# Patient Record
Sex: FEMALE | Race: WHITE | URBAN - METROPOLITAN AREA
[De-identification: names, ages, dates, MRNs, and addresses within clinical notes are randomized per-mention and may not be internally consistent; named-entity substitution may affect disease eponyms.]

---

## 2017-02-18 ENCOUNTER — HOSPITAL ENCOUNTER (EMERGENCY)
Facility: CLINIC | Age: 37
Discharge: HOME | End: 2017-02-18
Attending: FAMILY MEDICINE | Admitting: FAMILY MEDICINE

## 2017-02-18 ENCOUNTER — APPOINTMENT (OUTPATIENT)
Dept: GENERAL RADIOLOGY | Facility: CLINIC | Age: 37
End: 2017-02-18
Attending: FAMILY MEDICINE

## 2017-02-18 VITALS
HEART RATE: 90 BPM | RESPIRATION RATE: 20 BRPM | OXYGEN SATURATION: 95 % | DIASTOLIC BLOOD PRESSURE: 67 MMHG | SYSTOLIC BLOOD PRESSURE: 86 MMHG | TEMPERATURE: 98.6 F

## 2017-02-18 DIAGNOSIS — J45.901 ASTHMA EXACERBATION: ICD-10-CM

## 2017-02-18 LAB
FLUAV+FLUBV AG SPEC QL: NEGATIVE
FLUAV+FLUBV AG SPEC QL: NORMAL
SPECIMEN SOURCE: NORMAL

## 2017-02-18 PROCEDURE — 94640 AIRWAY INHALATION TREATMENT: CPT | Mod: 76

## 2017-02-18 PROCEDURE — 71020 XR CHEST 2 VW: CPT | Mod: TC

## 2017-02-18 PROCEDURE — 40000917 ZZH STATISTIC PEAK FLOW MEASUREMENT

## 2017-02-18 PROCEDURE — 99284 EMERGENCY DEPT VISIT MOD MDM: CPT | Performed by: FAMILY MEDICINE

## 2017-02-18 PROCEDURE — 25000308 HC RX OP HPI UCR WEL MED 250 IP 250: Performed by: FAMILY MEDICINE

## 2017-02-18 PROCEDURE — 25000125 ZZHC RX 250

## 2017-02-18 PROCEDURE — 94640 AIRWAY INHALATION TREATMENT: CPT

## 2017-02-18 PROCEDURE — 87804 INFLUENZA ASSAY W/OPTIC: CPT | Performed by: FAMILY MEDICINE

## 2017-02-18 PROCEDURE — 25000125 ZZHC RX 250: Performed by: FAMILY MEDICINE

## 2017-02-18 PROCEDURE — 99285 EMERGENCY DEPT VISIT HI MDM: CPT | Mod: 25

## 2017-02-18 RX ORDER — IPRATROPIUM BROMIDE AND ALBUTEROL SULFATE 2.5; .5 MG/3ML; MG/3ML
3 SOLUTION RESPIRATORY (INHALATION) ONCE
Status: COMPLETED | OUTPATIENT
Start: 2017-02-18 | End: 2017-02-18

## 2017-02-18 RX ORDER — IPRATROPIUM BROMIDE AND ALBUTEROL SULFATE 2.5; .5 MG/3ML; MG/3ML
SOLUTION RESPIRATORY (INHALATION)
Status: COMPLETED
Start: 2017-02-18 | End: 2017-02-18

## 2017-02-18 RX ORDER — PREDNISONE 20 MG/1
60 TABLET ORAL ONCE
Status: COMPLETED | OUTPATIENT
Start: 2017-02-18 | End: 2017-02-18

## 2017-02-18 RX ORDER — PREDNISONE 20 MG/1
60 TABLET ORAL DAILY
Qty: 15 TABLET | Refills: 0 | Status: SHIPPED | OUTPATIENT
Start: 2017-02-18 | End: 2017-08-09

## 2017-02-18 RX ORDER — IPRATROPIUM BROMIDE AND ALBUTEROL SULFATE 2.5; .5 MG/3ML; MG/3ML
1 SOLUTION RESPIRATORY (INHALATION) EVERY 6 HOURS PRN
Qty: 30 VIAL | Refills: 1 | Status: SHIPPED | OUTPATIENT
Start: 2017-02-18

## 2017-02-18 RX ORDER — ALBUTEROL SULFATE 0.83 MG/ML
2.5 SOLUTION RESPIRATORY (INHALATION) ONCE
Status: COMPLETED | OUTPATIENT
Start: 2017-02-18 | End: 2017-02-18

## 2017-02-18 RX ADMIN — PREDNISONE 60 MG: 20 TABLET ORAL at 08:52

## 2017-02-18 RX ADMIN — IPRATROPIUM BROMIDE AND ALBUTEROL SULFATE 3 ML: .5; 3 SOLUTION RESPIRATORY (INHALATION) at 08:16

## 2017-02-18 RX ADMIN — IPRATROPIUM BROMIDE AND ALBUTEROL SULFATE 3 ML: 2.5; .5 SOLUTION RESPIRATORY (INHALATION) at 08:28

## 2017-02-18 RX ADMIN — IPRATROPIUM BROMIDE AND ALBUTEROL SULFATE 3 ML: .5; 3 SOLUTION RESPIRATORY (INHALATION) at 08:54

## 2017-02-18 RX ADMIN — ALBUTEROL SULFATE 2.5 MG: 2.5 SOLUTION RESPIRATORY (INHALATION) at 10:21

## 2017-02-18 RX ADMIN — IPRATROPIUM BROMIDE AND ALBUTEROL SULFATE 3 ML: .5; 3 SOLUTION RESPIRATORY (INHALATION) at 08:28

## 2017-02-18 NOTE — ED AVS SNAPSHOT
[facility] Emergency Department    [address] [place] [address]    Phone:  [phone]    Fax:  [phone]                                       Maci Carrasco   MRN: [id number]    Department:  [facility] Emergency Department   Date of Visit:  2/18/2017           Patient Information     Date Of Birth          1980        Your diagnoses for this visit were:     Asthma exacerbation        You were seen by James Summers MD.      Follow-up Information     Follow up with Gypsy Cruz MD. Schedule an appointment as soon as possible for a visit in 4 days.    Specialty:  Family Practice    Why:  For follow up on your ED stay, If not improving.    Contact information:    [place] [address] [place] [address] [phone]        Discharge References/Attachments     ASTHMA, DISCHARGE INSTRUCTIONS FOR (ENGLISH)    USING A NEBULIZER WITH A MOUTHPIECE, STEP-BY-STEP (ENGLISH)      24 Hour Appointment Hotline       To make an appointment at any [facility], call [phone]). If you don't have a family doctor or clinic, we will help you find one. [facility] clinics are conveniently located to serve the needs of you and your family.             Review of your medicines      START taking        Dose / Directions Last dose taken    ipratropium - albuterol 0.5 mg/2.5 mg/3 mL 0.5-2.5 (3) MG/3ML neb solution   Commonly known as:  DUONEB   Dose:  1 vial   Quantity:  30 vial        Take 1 vial (3 mLs) by nebulization every 6 hours as needed for shortness of breath / dyspnea or wheezing   Refills:  1        predniSONE 20 MG tablet   Commonly known as:  DELTASONE   Dose:  60 mg   Quantity:  15 tablet        Take 3 tablets (60 mg) by mouth daily   Refills:  0                Prescriptions were sent or printed at these locations (2 Prescriptions)                   [facility] [place] [facility]  [address], [facility] [address]    Telephone:  [phone]   Fax:  [phone]   Hours:                  E-Prescribed (2 of 2)         ipratropium - albuterol 0.5 mg/2.5 mg/3 mL (DUONEB) 0.5-2.5 (3) MG/3ML neb solution               predniSONE (DELTASONE) 20 MG tablet                Procedures and tests performed during your visit     Influenza A/B antigen    XR Chest 2 Views      Orders Needing Specimen Collection     None      Pending Results     No orders found from 2017 to 2017.            Pending Culture Results     No orders found from 2017 to 2017.            Thank you for choosing [facility]       Thank you for choosing [facility] for your care. Our goal is always to provide you with excellent care. Hearing back from our patients is one way we can continue to improve our services. Please take a few minutes to complete the written survey that you may receive in the mail after you visit with us. Thank you!        [employer] Information     [employer] lets you send messages to your doctor, view your test results, renew your prescriptions, schedule appointments and more. To sign up, go to [email or web address] . Click on \"Log in\" on the left side of the screen, which will take you to the Welcome page. Then click on \"Sign up Now\" on the right side of the page.     You will be asked to enter the access code listed below, as well as some personal information. Please follow the directions to create your username and password.     Your access code is: [email or web address]  Expires: 2017 11:00 AM     Your access code will  in 90 days. If you need help or a new code, please call your [facility] clinic or [phone].        Care EveryWhere ID     This is your Care EveryWhere ID. This could be used by other organizations to access your [facility] medical records  [id number]        After Visit Summary       This is your record. Keep this with you and show to your community pharmacist(s) and doctor(s) at " your next visit.

## 2017-02-18 NOTE — ED AVS SNAPSHOT
[facility] Emergency Department    [address] [place] [address]    Phone:  [phone]    Fax:  [phone]                                       Maci Carrasco   MRN: [id number]    Department:  [facility] Emergency Department   Date of Visit:  2/18/2017           After Visit Summary Signature Page     I have received my discharge instructions, and my questions have been answered. I have discussed any challenges I see with this plan with the nurse or doctor.    ..........................................................................................................................................  Patient/Patient Representative Signature      ..........................................................................................................................................  Patient Representative Print Name and Relationship to Patient    ..................................................               ................................................  Date                                            Time    ..........................................................................................................................................  Reviewed by Signature/Title    ...................................................              ..............................................  Date                                                            Time

## 2017-02-18 NOTE — ED PROVIDER NOTES
History     Chief Complaint   Patient presents with     Wheezing     HPI  Maci Carrasco is a 36 year old female who presents with wheezing and shortness of breath was started last night.  Patient has a long history of asthma but last had to come into the hospital 2 years ago.  That was also the last time she was on steroids.  She has inhalers that she just uses occasionally.  Last night her wheezing started significantly and she was using her inhaler and it was not helping.  She denies any recent fevers or chills.  She denies a sore throat or runny nose.  She is not had any body aches.    I have reviewed the Medications, Allergies, Past Medical and Surgical History, and Social History in the Epic system.    Review of Systems   All other systems reviewed and are negative.      Physical Exam   BP: 97/70  Pulse: 109  Resp: 20  SpO2: 96 %  Physical Exam   Constitutional: She appears well-developed and well-nourished. No distress.   HENT:   Head: Normocephalic and atraumatic.   Mouth/Throat: Oropharynx is clear and moist. No oropharyngeal exudate.   Eyes: Conjunctivae are normal. Pupils are equal, round, and reactive to light.   Neck: Normal range of motion. Neck supple.   Cardiovascular: Normal rate, regular rhythm and normal heart sounds.  Exam reveals no gallop and no friction rub.    No murmur heard.  Pulmonary/Chest: Effort normal. No respiratory distress. She has wheezes. She has no rales. She exhibits no tenderness.   Skin: She is not diaphoretic.       ED Course     ED Course     Procedures         Results for orders placed or performed during the hospital encounter of 02/18/17   XR Chest 2 Views    Narrative    XR CHEST 2 VW 2/18/2017 9:57 AM     HISTORY: cough, sob      Impression    IMPRESSION: Negative exam.    MARKEL PRADO MD   Influenza A/B antigen   Result Value Ref Range    Influenza A/B Agn Specimen Nose     Influenza A Negative NEG    Influenza B  NEG     Negative   Test results must be  correlated with clinical data. If necessary, results   should be confirmed by a molecular assay or viral culture.       Medications   ipratropium - albuterol 0.5 mg/2.5 mg/3 mL (DUONEB) neb solution 3 mL (3 mLs Nebulization Given 2/18/17 0816)   ipratropium - albuterol 0.5 mg/2.5 mg/3 mL (DUONEB) neb solution 3 mL (3 mLs Nebulization Given 2/18/17 0828)   predniSONE (DELTASONE) tablet 60 mg (60 mg Oral Given 2/18/17 0852)   ipratropium - albuterol 0.5 mg/2.5 mg/3 mL (DUONEB) neb solution 3 mL (3 mLs Nebulization Given 2/18/17 0854)   albuterol neb solution 2.5 mg (2.5 mg Nebulization Given 2/18/17 1021)     patient's chest x-ray and influenza were negative.  Patient received multiple neb treatments and some oral prednisone.  She is marginally better.  She continues to wheeze significantly but is not requiring any oxygen.  Patient would like to try and go home at this point.  I did offer an observation stay in the hospital.  I will discharge her home with DuoNeb's as she has a nebulizer machine at home.  Will continue on oral steroids.  She was given strict return precautions.    Assessments & Plan (with Medical Decision Making)  asthma exacerbation      I have reviewed the nursing notes.    I have reviewed the findings, diagnosis, plan and need for follow up with the patient.            2/18/2017   [facility] EMERGENCY DEPARTMENT     James Summers MD  02/18/17 1038

## 2017-08-09 ENCOUNTER — APPOINTMENT (OUTPATIENT)
Dept: ULTRASOUND IMAGING | Facility: CLINIC | Age: 37
End: 2017-08-09
Attending: EMERGENCY MEDICINE

## 2017-08-09 ENCOUNTER — HOSPITAL ENCOUNTER (EMERGENCY)
Facility: CLINIC | Age: 37
Discharge: HOME | End: 2017-08-09
Attending: EMERGENCY MEDICINE | Admitting: EMERGENCY MEDICINE

## 2017-08-09 VITALS
WEIGHT: 143 LBS | SYSTOLIC BLOOD PRESSURE: 94 MMHG | HEART RATE: 92 BPM | DIASTOLIC BLOOD PRESSURE: 58 MMHG | HEIGHT: 62 IN | OXYGEN SATURATION: 96 % | BODY MASS INDEX: 26.31 KG/M2 | TEMPERATURE: 99 F | RESPIRATION RATE: 16 BRPM

## 2017-08-09 DIAGNOSIS — N10 ACUTE PYELONEPHRITIS: ICD-10-CM

## 2017-08-09 DIAGNOSIS — R10.9 RIGHT FLANK PAIN: ICD-10-CM

## 2017-08-09 LAB
ALBUMIN SERPL-MCNC: 3.3 G/DL (ref 3.4–5)
ALBUMIN UR-MCNC: 30 MG/DL
ALP SERPL-CCNC: 59 U/L (ref 40–150)
ALT SERPL W P-5'-P-CCNC: 23 U/L (ref 0–50)
ANION GAP SERPL CALCULATED.3IONS-SCNC: 5 MMOL/L (ref 3–14)
APPEARANCE UR: ABNORMAL
AST SERPL W P-5'-P-CCNC: 15 U/L (ref 0–45)
BACTERIA #/AREA URNS HPF: ABNORMAL /HPF
BASOPHILS # BLD AUTO: 0 10E9/L (ref 0–0.2)
BASOPHILS NFR BLD AUTO: 0.4 %
BILIRUB SERPL-MCNC: 0.6 MG/DL (ref 0.2–1.3)
BILIRUB UR QL STRIP: NEGATIVE
BUN SERPL-MCNC: 13 MG/DL (ref 7–30)
CALCIUM SERPL-MCNC: 8.3 MG/DL (ref 8.5–10.1)
CHLORIDE SERPL-SCNC: 101 MMOL/L (ref 94–109)
CO2 SERPL-SCNC: 29 MMOL/L (ref 20–32)
COLOR UR AUTO: YELLOW
CREAT SERPL-MCNC: 0.79 MG/DL (ref 0.52–1.04)
DIFFERENTIAL METHOD BLD: ABNORMAL
EOSINOPHIL # BLD AUTO: 0 10E9/L (ref 0–0.7)
EOSINOPHIL NFR BLD AUTO: 0.2 %
ERYTHROCYTE [DISTWIDTH] IN BLOOD BY AUTOMATED COUNT: 11.8 % (ref 10–15)
GFR SERPL CREATININE-BSD FRML MDRD: 82 ML/MIN/1.7M2
GLUCOSE SERPL-MCNC: 95 MG/DL (ref 70–99)
GLUCOSE UR STRIP-MCNC: NEGATIVE MG/DL
HCG UR QL: NEGATIVE
HCT VFR BLD AUTO: 30.8 % (ref 35–47)
HGB BLD-MCNC: 10.3 G/DL (ref 11.7–15.7)
HGB UR QL STRIP: ABNORMAL
IMM GRANULOCYTES # BLD: 0 10E9/L (ref 0–0.4)
IMM GRANULOCYTES NFR BLD: 0.2 %
KETONES UR STRIP-MCNC: 20 MG/DL
LEUKOCYTE ESTERASE UR QL STRIP: ABNORMAL
LIPASE SERPL-CCNC: 75 U/L (ref 73–393)
LYMPHOCYTES # BLD AUTO: 1.6 10E9/L (ref 0.8–5.3)
LYMPHOCYTES NFR BLD AUTO: 32.2 %
MCH RBC QN AUTO: 30.1 PG (ref 26.5–33)
MCHC RBC AUTO-ENTMCNC: 33.4 G/DL (ref 31.5–36.5)
MCV RBC AUTO: 90 FL (ref 78–100)
MONOCYTES # BLD AUTO: 0.8 10E9/L (ref 0–1.3)
MONOCYTES NFR BLD AUTO: 16.9 %
MUCOUS THREADS #/AREA URNS LPF: PRESENT /LPF
NEUTROPHILS # BLD AUTO: 2.4 10E9/L (ref 1.6–8.3)
NEUTROPHILS NFR BLD AUTO: 50.1 %
NITRATE UR QL: POSITIVE
PH UR STRIP: 6 PH (ref 5–7)
PLATELET # BLD AUTO: 189 10E9/L (ref 150–450)
POTASSIUM SERPL-SCNC: 3.4 MMOL/L (ref 3.4–5.3)
PROT SERPL-MCNC: 7.2 G/DL (ref 6.8–8.8)
RBC # BLD AUTO: 3.42 10E12/L (ref 3.8–5.2)
RBC #/AREA URNS AUTO: 3 /HPF (ref 0–2)
SODIUM SERPL-SCNC: 135 MMOL/L (ref 133–144)
SP GR UR STRIP: 1.02 (ref 1–1.03)
SQUAMOUS #/AREA URNS AUTO: 1 /HPF (ref 0–1)
URN SPEC COLLECT METH UR: ABNORMAL
UROBILINOGEN UR STRIP-MCNC: 0 MG/DL (ref 0–2)
WBC # BLD AUTO: 4.8 10E9/L (ref 4–11)
WBC #/AREA URNS AUTO: >182 /HPF (ref 0–2)
WBC CLUMPS #/AREA URNS HPF: PRESENT /HPF

## 2017-08-09 PROCEDURE — 25000128 H RX IP 250 OP 636: Performed by: EMERGENCY MEDICINE

## 2017-08-09 PROCEDURE — 80053 COMPREHEN METABOLIC PANEL: CPT | Performed by: EMERGENCY MEDICINE

## 2017-08-09 PROCEDURE — 96361 HYDRATE IV INFUSION ADD-ON: CPT

## 2017-08-09 PROCEDURE — 85025 COMPLETE CBC W/AUTO DIFF WBC: CPT | Performed by: EMERGENCY MEDICINE

## 2017-08-09 PROCEDURE — 81025 URINE PREGNANCY TEST: CPT | Performed by: EMERGENCY MEDICINE

## 2017-08-09 PROCEDURE — 99285 EMERGENCY DEPT VISIT HI MDM: CPT | Mod: Z6 | Performed by: EMERGENCY MEDICINE

## 2017-08-09 PROCEDURE — 87086 URINE CULTURE/COLONY COUNT: CPT | Performed by: EMERGENCY MEDICINE

## 2017-08-09 PROCEDURE — 83690 ASSAY OF LIPASE: CPT | Performed by: EMERGENCY MEDICINE

## 2017-08-09 PROCEDURE — 87186 SC STD MICRODIL/AGAR DIL: CPT | Performed by: EMERGENCY MEDICINE

## 2017-08-09 PROCEDURE — 87088 URINE BACTERIA CULTURE: CPT | Performed by: EMERGENCY MEDICINE

## 2017-08-09 PROCEDURE — 81001 URINALYSIS AUTO W/SCOPE: CPT | Performed by: EMERGENCY MEDICINE

## 2017-08-09 PROCEDURE — 96375 TX/PRO/DX INJ NEW DRUG ADDON: CPT

## 2017-08-09 PROCEDURE — 76770 US EXAM ABDO BACK WALL COMP: CPT

## 2017-08-09 PROCEDURE — 96365 THER/PROPH/DIAG IV INF INIT: CPT

## 2017-08-09 PROCEDURE — 99285 EMERGENCY DEPT VISIT HI MDM: CPT | Mod: 25

## 2017-08-09 RX ORDER — HYDROCODONE BITARTRATE AND ACETAMINOPHEN 5; 325 MG/1; MG/1
1-2 TABLET ORAL EVERY 4 HOURS PRN
Qty: 15 TABLET | Refills: 0 | Status: SHIPPED | OUTPATIENT
Start: 2017-08-09

## 2017-08-09 RX ORDER — CEFTRIAXONE 1 G/1
1 INJECTION, POWDER, FOR SOLUTION INTRAMUSCULAR; INTRAVENOUS ONCE
Status: COMPLETED | OUTPATIENT
Start: 2017-08-09 | End: 2017-08-09

## 2017-08-09 RX ORDER — CIPROFLOXACIN 500 MG/1
500 TABLET, FILM COATED ORAL 2 TIMES DAILY
Qty: 14 TABLET | Refills: 0 | Status: SHIPPED | OUTPATIENT
Start: 2017-08-09 | End: 2017-08-16

## 2017-08-09 RX ORDER — KETOROLAC TROMETHAMINE 30 MG/ML
30 INJECTION, SOLUTION INTRAMUSCULAR; INTRAVENOUS ONCE
Status: COMPLETED | OUTPATIENT
Start: 2017-08-09 | End: 2017-08-09

## 2017-08-09 RX ORDER — HYDROMORPHONE HYDROCHLORIDE 1 MG/ML
0.5 INJECTION, SOLUTION INTRAMUSCULAR; INTRAVENOUS; SUBCUTANEOUS
Status: DISCONTINUED | OUTPATIENT
Start: 2017-08-09 | End: 2017-08-09 | Stop reason: HOSPADM

## 2017-08-09 RX ORDER — SODIUM CHLORIDE 9 MG/ML
1000 INJECTION, SOLUTION INTRAVENOUS CONTINUOUS
Status: DISCONTINUED | OUTPATIENT
Start: 2017-08-09 | End: 2017-08-09 | Stop reason: HOSPADM

## 2017-08-09 RX ADMIN — SODIUM CHLORIDE 1000 ML: 9 INJECTION, SOLUTION INTRAVENOUS at 11:31

## 2017-08-09 RX ADMIN — CEFTRIAXONE SODIUM 1 G: 1 INJECTION, POWDER, FOR SOLUTION INTRAMUSCULAR; INTRAVENOUS at 12:22

## 2017-08-09 RX ADMIN — SODIUM CHLORIDE 1000 ML: 9 INJECTION, SOLUTION INTRAVENOUS at 10:33

## 2017-08-09 RX ADMIN — KETOROLAC TROMETHAMINE 30 MG: 30 INJECTION, SOLUTION INTRAMUSCULAR at 10:37

## 2017-08-09 RX ADMIN — HYDROMORPHONE HYDROCHLORIDE 0.5 MG: 1 INJECTION, SOLUTION INTRAMUSCULAR; INTRAVENOUS; SUBCUTANEOUS at 11:28

## 2017-08-09 NOTE — ED PROVIDER NOTES
History     Chief Complaint   Patient presents with     Flank Pain     HPI  Maci Carrasco is a 37 year old female who presents with intermittent left flank pain since last Friday.  The last 2-3 days the pain is become more constant and wraps around to her right side of the abdomen.  No nausea or vomiting.  No fever but felt chilled especially last evening.  She's had urinary frequency and thought she may have had some blood in the urine.  Patient has any vaginal discharge or bleeding.  She denies diarrhea or change in her stool pattern.  She has a mild nonproductive cough.  Denies chest pain or shortness of breath.  No skin rash or lesion.  No history of shingles.  No fall or injury.  No treatment prior to arrival.  No personal family history of biliary disease.  No family history of inflammatory bowel disease or diverticulitis.  No recent travel or exposure to infectious illness.    I have reviewed the Medications, Allergies, Past Medical and Surgical History, and Social History in the Epic system.      Review of Systems all others reviewed and are negative.  Past Medical History:   Diagnosis Date     Uncomplicated asthma      There is no problem list on file for this patient.    Current Facility-Administered Medications   Medication     0.9% sodium chloride infusion     HYDROmorphone (PF) (DILAUDID) injection 0.5 mg     Current Outpatient Prescriptions   Medication     ciprofloxacin (CIPRO) 500 MG tablet     HYDROcodone-acetaminophen (NORCO) 5-325 MG per tablet     ipratropium - albuterol 0.5 mg/2.5 mg/3 mL (DUONEB) 0.5-2.5 (3) MG/3ML neb solution        Allergies   Allergen Reactions     Feathers Anaphylaxis     Cats      animal dander cause sneezing and watery eyes     Social History     Social History     Marital status: Single     Spouse name: N/A     Number of children: N/A     Years of education: N/A     Occupational History     Not on file.     Social History Main Topics     Smoking status: Current  "Every Day Smoker     Packs/day: 1.00     Smokeless tobacco: Never Used     Alcohol use Yes      Comment: rare     Drug use: No     Sexual activity: Yes     Partners: Male     Other Topics Concern     Not on file     Social History Narrative     No family history on file.        Physical Exam   BP: 102/58  Pulse: 92  Temp: 99  F (37.2  C)  Resp: 16  Height: 157.5 cm (5' 2\")  Weight: 64.9 kg (143 lb)  SpO2: 100 %  Physical Exam Gen. alert cooperative female in moderate distress.  HEENT shows no scleral icterus.  Speech is clear and concise.  Neck is supple.  Lungs are clear without adventitious sounds.  Cardiac regular without murmur.  Back she has right-sided CVA tenderness to percussion.  Abdomen active bowel sounds and on palpation she tender in the right upper quadrant and right lateral abdomen.  No right lower quadrant tenderness.  No skin rash or flank or abdomen.  No abdominal pain with percussion of the heel.  Negative psoas and obturator signs.    ED Course     ED Course     Procedures         Results for orders placed or performed during the hospital encounter of 08/09/17   Retroperitoneal US    Narrative    RENAL ULTRASOUND   8/9/2017 1:04 PM     HISTORY: Right flank pain, question of pyelo. Right flank pain for 6  days.    COMPARISON: None.    FINDINGS: The kidneys are normal in size and cortical thickness. No  renal masses are seen. There is no hydronephrosis or renal calculus.    Urinary bladder is nearly empty (3 mL volume) and cannot be evaluated.      Impression    IMPRESSION: Essentially normal renal ultrasound. No evidence for  urinary system obstruction is identified.              Critical Care time:  none               Labs Ordered and Resulted from Time of ED Arrival Up to the Time of Departure from the ED   CBC WITH PLATELETS DIFFERENTIAL - Abnormal; Notable for the following:        Result Value    RBC Count 3.42 (*)     Hemoglobin 10.3 (*)     Hematocrit 30.8 (*)     All other components " within normal limits   COMPREHENSIVE METABOLIC PANEL - Abnormal; Notable for the following:     Calcium 8.3 (*)     Albumin 3.3 (*)     All other components within normal limits   URINE MACROSCOPIC WITH REFLEX TO MICRO - Abnormal; Notable for the following:     Ketones Urine 20 (*)     Blood Urine Small (*)     Protein Albumin Urine 30 (*)     Nitrite Urine Positive (*)     Leukocyte Esterase Urine Large (*)     RBC Urine 3 (*)     WBC Urine >182 (*)     WBC Clumps Present (*)     Bacteria Urine Many (*)     Mucous Urine Present (*)     All other components within normal limits   LIPASE   HCG QUALITATIVE URINE   URINE CULTURE AEROBIC BACTERIAL     IV is established, fluid bolus and Toradol.  Blood work and urinalysis ordered.  At 10:50 AM on recheck the patient states she has no change in her pain.  IV Dilaudid is ordered.  She has not urinated yet.  Discussed results patient urine being grossly positive with suspected UTI and possible pyelonephritis.  IV Rocephin is ordered.  Urine culture is added.  Renal ultrasound is added.  Ultrasound showed no acute abnormality as described above.  Assessments & Plan (with Medical Decision Making)   Patient is a 27-year-old female presents with intermittent right flank pain last week or so.  Symptoms had worsened.  And the patient associated shakes but no fever.  No vomiting.  She did notice some urinary frequency and blood in the urine.  No diarrhea.  No personal or family history of inflammatory bowel disease.  No personal history of biliary disease.  Exam she was tender with percussion in the right CVA area and on the right upper abdomen.  No guarding or rebound.  Patient's blood work including renal function was unremarkable.  She is not pregnant.  Her urine was grossly positive.  Culture is added.  She is given IV Rocephin.  Ultrasound showed no hydronephrosis.  Patient was given IV Toradol and Dilaudid with improvement in her pain.  She is given information on  pyelonephritis.  Cipro twice daily for 7 days.  She can take ibuprofen or Vicodin for pain.  Push fluids.  I asked her tofollow-up with her primary doctor after completion of antibiotics recheck the urine.  Reasons to return to the ER were discussed.  I have reviewed the nursing notes.    I have reviewed the findings, diagnosis, plan and need for follow up with the patient.       New Prescriptions    CIPROFLOXACIN (CIPRO) 500 MG TABLET    Take 1 tablet (500 mg) by mouth 2 times daily for 7 days    HYDROCODONE-ACETAMINOPHEN (NORCO) 5-325 MG PER TABLET    Take 1-2 tablets by mouth every 4 hours as needed for moderate to severe pain       Final diagnoses:   Right flank pain   Acute pyelonephritis       8/9/2017   [facility] EMERGENCY DEPARTMENT     David Weldon MD  08/09/17 1411       David Weldon MD  08/09/17 4796

## 2017-08-09 NOTE — ED AVS SNAPSHOT
[facility] Emergency Department    [address] [place] [address]    Phone:  [phone]    Fax:  [phone]                                       Maci Carrasco   MRN: [id number]    Department:  [facility] Emergency Department   Date of Visit:  8/9/2017           After Visit Summary Signature Page     I have received my discharge instructions, and my questions have been answered. I have discussed any challenges I see with this plan with the nurse or doctor.    ..........................................................................................................................................  Patient/Patient Representative Signature      ..........................................................................................................................................  Patient Representative Print Name and Relationship to Patient    ..................................................               ................................................  Date                                            Time    ..........................................................................................................................................  Reviewed by Signature/Title    ...................................................              ..............................................  Date                                                            Time

## 2017-08-09 NOTE — ED AVS SNAPSHOT
[facility] Emergency Department    [address] [place] [address]    Phone:  [phone]    Fax:  [phone]                                       Maci Carrasco   MRN: [id number]    Department:  [facility] Emergency Department   Date of Visit:  8/9/2017           Patient Information     Date Of Birth          1980        Your diagnoses for this visit were:     Right flank pain     Acute pyelonephritis        You were seen by David Weldon MD.      Follow-up Information     Follow up with None.    Why:  7 to10 days to recheck her urine      Discharge References/Attachments     PYELONEPHRITIS, FEMALE (ADULT) (ENGLISH)      24 Hour Appointment Hotline       To make an appointment at any [facility] clinic, call [phone]). If you don't have a family doctor or clinic, we will help you find one. [facility] clinics are conveniently located to serve the needs of you and your family.             Review of your medicines      START taking        Dose / Directions Last dose taken    ciprofloxacin 500 MG tablet   Commonly known as:  CIPRO   Dose:  500 mg   Quantity:  14 tablet        Take 1 tablet (500 mg) by mouth 2 times daily for 7 days   Refills:  0        HYDROcodone-acetaminophen 5-325 MG per tablet   Commonly known as:  NORCO   Dose:  1-2 tablet   Quantity:  15 tablet        Take 1-2 tablets by mouth every 4 hours as needed for moderate to severe pain   Refills:  0          Our records show that you are taking the medicines listed below. If these are incorrect, please call your family doctor or clinic.        Dose / Directions Last dose taken    ipratropium - albuterol 0.5 mg/2.5 mg/3 mL 0.5-2.5 (3) MG/3ML neb solution   Commonly known as:  DUONEB   Dose:  1 vial   Quantity:  30 vial        Take 1 vial (3 mLs) by nebulization every 6 hours as needed for shortness of breath / dyspnea or wheezing   Refills:  1                Prescriptions were sent or printed  "at these locations (2 Prescriptions)                   [facility], [place] - [address]    [address] [place] [address]    Telephone:  [phone]   Fax:  [phone]   Hours:                  Printed at Department/Unit printer (2 of 2)         ciprofloxacin (CIPRO) 500 MG tablet               HYDROcodone-acetaminophen (NORCO) 5-325 MG per tablet                Procedures and tests performed during your visit     CBC with platelets differential    Comprehensive metabolic panel    HCG qualitative urine    Lipase    Retroperitoneal US    UA reflex to Microscopic    Urine Culture      Orders Needing Specimen Collection     None      Pending Results     Date and Time Order Name Status Description    8/9/2017 1152 Retroperitoneal US Preliminary     8/9/2017 1150 Urine Culture In process             Pending Culture Results     Date and Time Order Name Status Description    8/9/2017 1150 Urine Culture In process             Pending Results Instructions     If you had any lab results that were not finalized at the time of your Discharge, you can call the ED Lab Result RN at [phone]. You will be contacted by this team for any positive Lab results or changes in treatment. The nurses are available 7 days a week from 10A to 6:30P.  You can leave a message 24 hours per day and they will return your call.        Thank you for choosing [facility]       Thank you for choosing [facility] for your care. Our goal is always to provide you with excellent care. Hearing back from our patients is one way we can continue to improve our services. Please take a few minutes to complete the written survey that you may receive in the mail after you visit with us. Thank you!        [employer] Information     [employer] lets you send messages to your doctor, view your test results, renew your prescriptions, schedule appointments and more. To sign up, go to [email or web address] . Click on \"Log in\" on the left " "side of the screen, which will take you to the Welcome page. Then click on \"Sign up Now\" on the right side of the page.     You will be asked to enter the access code listed below, as well as some personal information. Please follow the directions to create your username and password.     Your access code is: [email or web address]  Expires: 2017  2:27 PM     Your access code will  in 90 days. If you need help or a new code, please call your [facility] clinic or [phone].        Care EveryWhere ID     This is your Care EveryWhere ID. This could be used by other organizations to access your [facility] medical records  [id number]        Equal Access to Services     GONZALEZ PATRICK : America Wallace, meng hoskins, milagros lino, yovana neves. So [facility] [phone].    ATENCIÓN: Si habla español, tiene a pearson disposición servicios gratuitos de asistencia lingüística. Llame al [phone].    We comply with applicable federal civil rights laws and [place] laws. We do not discriminate on the basis of race, color, national origin, age, disability sex, sexual orientation or gender identity.            After Visit Summary       This is your record. Keep this with you and show to your community pharmacist(s) and doctor(s) at your next visit.                  "

## 2017-08-11 ENCOUNTER — TELEPHONE (OUTPATIENT)
Dept: EMERGENCY MEDICINE | Facility: CLINIC | Age: 37
End: 2017-08-11

## 2017-08-11 LAB
BACTERIA SPEC CULT: ABNORMAL
MICRO REPORT STATUS: ABNORMAL
MICROORGANISM SPEC CULT: ABNORMAL
SPECIMEN SOURCE: ABNORMAL

## 2017-08-11 NOTE — TELEPHONE ENCOUNTER
[facility] Emergency Department Lab result notification:    [place] ED lab result protocol used  Urine Culture    Reason for call  Notify of lab results, assess symptoms,  review ED providers recommendations/discharge instructions (if necessary) and advise per ED lab result f/u protocol    Lab Result  Final Urine Culture Report on 8/11/17  [place] ED discharge antibiotic: Ciprofloxacin (Cipro) 500 mg tablet,  Take 1 tablet (500 mg) by mouth 2 times daily for 7 days  #1. Bacteria, >100,000 colonies/mL Escherichia coli , is SUSCEPTIBLE to ED discharge antibiotic.    As per [place] ED Lab Result protocol, no change in antibiotic therapy.    Information table from ED Provider visit on 8/9/17  Symptoms reported at ED visit (Chief complaint, HPI) Maci Carrasco is a 37 year old female who presents with intermittent left flank pain since last Friday.  The last 2-3 days the pain is become more constant and wraps around to her right side of the abdomen.  No nausea or vomiting.  No fever but felt chilled especially last evening.  She's had urinary frequency and thought she may have had some blood in the urine.  Patient has any vaginal discharge or bleeding.  She denies diarrhea or change in her stool pattern.  She has a mild nonproductive cough.  Denies chest pain or shortness of breath.  No skin rash or lesion.  No history of shingles.  No fall or injury.  No treatment prior to arrival.  No personal family history of biliary disease.  No family history of inflammatory bowel disease or diverticulitis.  No recent travel or exposure to infectious illness.   ED providers Impression and Plan (applicable information) Patient is a 27-year-old female presents with intermittent right flank pain last week or so.  Symptoms had worsened.  And the patient associated shakes but no fever.  No vomiting.  She did notice some urinary frequency and blood in the urine.  No diarrhea.  No personal or family history of  inflammatory bowel disease.  No personal history of biliary disease.  Exam she was tender with percussion in the right CVA area and on the right upper abdomen.  No guarding or rebound.  Patient's blood work including renal function was unremarkable.  She is not pregnant.  Her urine was grossly positive.  Culture is added.  She is given IV Rocephin.  Ultrasound showed no hydronephrosis.  Patient was given IV Toradol and Dilaudid with improvement in her pain.  She is given information on pyelonephritis.  Cipro twice daily for 7 days.  She can take ibuprofen or Vicodin for pain.  Push fluids.  I asked her tofollow-up with her primary doctor after completion of antibiotics recheck the urine.  Reasons to return to the ER were discussed.   Miscellaneous information N/A     RN Assessment (Patient s current Symptoms), include time called.  [Insert Left message here if message left]  Detailed msg (okay per EPIC) left at 3:16 pm.  Msg included contact number for this nurse, if any questions/concerns/or clarifications needed.    Please Contact your PCP clinic or return to the Emergency department if your:    Symptoms return.    Symptoms do not improve after 3 days on antibiotic.    Symptoms do not resolve after completing antibiotic.    Symptoms worsen or other concerning symptom's.    PCP follow-up Questions asked: NO    Michaela Boothe RN    [place] [facility] RN  Lung Nodule and ED Lab Results F/U RN  Epic pool (ED late result f/u RN) : P [id number]   # [phone]    Copy of Lab result   Order   Urine Culture [[id number]] (Order [id number])   Exam Information   Exam Date Exam Time Accession # Results    8/9/17 11:31 AM [id number]    Component Results   Component Collected Lab   Specimen Description 08/09/2017 11:31 AM [facility]   Midstream Urine   Culture Micro (Abnormal) 08/09/2017 11:31 AM [facility]   >100,000 colonies/mL Escherichia coli   Micro Report Status 08/09/2017 11:31 AM [facility]   FINAL 08/11/2017   Organism:  08/09/2017 11:31 AM [facility]   >100,000 colonies/mL Escherichia coli   Culture & Susceptibility   >100,000 COLONIES/ML ESCHERICHIA COLI (STEFANY)   Antibiotic Sensitivity Unit Status   AMPICILLIN 4 Susceptible ug/mL Final   AMPICILLIN/SULBACTAM <=2 Susceptible ug/mL Final   CEFAZOLIN <=4 Susceptible  Cefazolin STEFANY breakpoints are for the treatment of uncomplicated urinary tract   infections.  For the treatment of systemic infections, please contact the   laboratory for additional testing. ug/mL Final   CEFEPIME <=1 Susceptible ug/mL Final   CEFOXITIN <=4 Susceptible ug/mL Final   CEFTAZIDIME <=1 Susceptible ug/mL Final   CEFTRIAXONE <=1 Susceptible ug/mL Final   CIPROFLOXACIN <=0.25 Susceptible ug/mL Final   GENTAMICIN <=1 Susceptible ug/mL Final   LEVOFLOXACIN <=0.12 Susceptible ug/mL Final   NITROFURANTOIN <=16 Susceptible ug/mL Final   Piperacillin/Tazo <=4 Susceptible ug/mL Final   TOBRAMYCIN <=1 Susceptible ug/mL Final   Trimethoprim/Sulfa <=1/19 Susceptible ug/mL Final